# Patient Record
Sex: FEMALE | Race: WHITE
[De-identification: names, ages, dates, MRNs, and addresses within clinical notes are randomized per-mention and may not be internally consistent; named-entity substitution may affect disease eponyms.]

---

## 2018-12-29 ENCOUNTER — HOSPITAL ENCOUNTER (INPATIENT)
Dept: HOSPITAL 15 - ER | Age: 73
LOS: 2 days | Discharge: HOME | DRG: 872 | End: 2018-12-31
Attending: INTERNAL MEDICINE | Admitting: INTERNAL MEDICINE
Payer: COMMERCIAL

## 2018-12-29 VITALS — SYSTOLIC BLOOD PRESSURE: 158 MMHG | DIASTOLIC BLOOD PRESSURE: 119 MMHG

## 2018-12-29 VITALS — BODY MASS INDEX: 29.48 KG/M2 | HEIGHT: 66 IN | WEIGHT: 183.42 LBS

## 2018-12-29 DIAGNOSIS — E11.22: ICD-10-CM

## 2018-12-29 DIAGNOSIS — M41.9: ICD-10-CM

## 2018-12-29 DIAGNOSIS — I48.92: ICD-10-CM

## 2018-12-29 DIAGNOSIS — N18.3: ICD-10-CM

## 2018-12-29 DIAGNOSIS — A04.72: ICD-10-CM

## 2018-12-29 DIAGNOSIS — I48.91: ICD-10-CM

## 2018-12-29 DIAGNOSIS — E86.0: ICD-10-CM

## 2018-12-29 DIAGNOSIS — E78.5: ICD-10-CM

## 2018-12-29 DIAGNOSIS — J98.11: ICD-10-CM

## 2018-12-29 DIAGNOSIS — I13.10: ICD-10-CM

## 2018-12-29 DIAGNOSIS — A41.9: Primary | ICD-10-CM

## 2018-12-29 DIAGNOSIS — D68.69: ICD-10-CM

## 2018-12-29 DIAGNOSIS — E87.6: ICD-10-CM

## 2018-12-29 DIAGNOSIS — K29.70: ICD-10-CM

## 2018-12-29 DIAGNOSIS — E11.21: ICD-10-CM

## 2018-12-29 DIAGNOSIS — K29.80: ICD-10-CM

## 2018-12-29 DIAGNOSIS — K26.3: ICD-10-CM

## 2018-12-29 DIAGNOSIS — N39.0: ICD-10-CM

## 2018-12-29 DIAGNOSIS — E44.0: ICD-10-CM

## 2018-12-29 LAB
ALBUMIN SERPL-MCNC: 3.2 G/DL (ref 3.4–5)
ALP SERPL-CCNC: 161 U/L (ref 45–117)
ALT SERPL-CCNC: 23 U/L (ref 13–56)
AMYLASE SERPL-CCNC: 38 U/L (ref 25–115)
ANION GAP SERPL CALCULATED.3IONS-SCNC: 7 MMOL/L (ref 5–15)
APTT PPP: 29.8 SEC (ref 23.78–33.04)
BILIRUB SERPL-MCNC: 0.4 MG/DL (ref 0.2–1)
BUN SERPL-MCNC: 22 MG/DL (ref 7–18)
BUN/CREAT SERPL: 18.8
CALCIUM SERPL-MCNC: 8.8 MG/DL (ref 8.5–10.1)
CHLORIDE SERPL-SCNC: 103 MMOL/L (ref 98–107)
CO2 SERPL-SCNC: 27 MMOL/L (ref 21–32)
GLUCOSE SERPL-MCNC: 220 MG/DL (ref 74–106)
HCT VFR BLD AUTO: 42.3 % (ref 36–46)
HGB BLD-MCNC: 14.1 G/DL (ref 12.2–16.2)
INR PPP: 1 (ref 0.9–1.15)
LIPASE SERPL-CCNC: 242 U/L (ref 73–393)
MAGNESIUM SERPL-MCNC: 1.9 MG/DL (ref 1.6–2.6)
MCH RBC QN AUTO: 28.9 PG (ref 28–32)
MCV RBC AUTO: 86.5 FL (ref 80–100)
NRBC BLD QL AUTO: 0 %
POTASSIUM SERPL-SCNC: 3.1 MMOL/L (ref 3.5–5.1)
PROT SERPL-MCNC: 7.3 G/DL (ref 6.4–8.2)
PROTHROMBIN TIME: 10.7 SEC (ref 9.27–12.13)
SODIUM SERPL-SCNC: 137 MMOL/L (ref 136–145)

## 2018-12-29 PROCEDURE — 82150 ASSAY OF AMYLASE: CPT

## 2018-12-29 PROCEDURE — 84484 ASSAY OF TROPONIN QUANT: CPT

## 2018-12-29 PROCEDURE — 82962 GLUCOSE BLOOD TEST: CPT

## 2018-12-29 PROCEDURE — 93306 TTE W/DOPPLER COMPLETE: CPT

## 2018-12-29 PROCEDURE — 83690 ASSAY OF LIPASE: CPT

## 2018-12-29 PROCEDURE — 96375 TX/PRO/DX INJ NEW DRUG ADDON: CPT

## 2018-12-29 PROCEDURE — 43239 EGD BIOPSY SINGLE/MULTIPLE: CPT

## 2018-12-29 PROCEDURE — 85730 THROMBOPLASTIN TIME PARTIAL: CPT

## 2018-12-29 PROCEDURE — 74176 CT ABD & PELVIS W/O CONTRAST: CPT

## 2018-12-29 PROCEDURE — 83036 HEMOGLOBIN GLYCOSYLATED A1C: CPT

## 2018-12-29 PROCEDURE — 87086 URINE CULTURE/COLONY COUNT: CPT

## 2018-12-29 PROCEDURE — 94761 N-INVAS EAR/PLS OXIMETRY MLT: CPT

## 2018-12-29 PROCEDURE — 80053 COMPREHEN METABOLIC PANEL: CPT

## 2018-12-29 PROCEDURE — 87493 C DIFF AMPLIFIED PROBE: CPT

## 2018-12-29 PROCEDURE — 96361 HYDRATE IV INFUSION ADD-ON: CPT

## 2018-12-29 PROCEDURE — 87040 BLOOD CULTURE FOR BACTERIA: CPT

## 2018-12-29 PROCEDURE — 83735 ASSAY OF MAGNESIUM: CPT

## 2018-12-29 PROCEDURE — 36415 COLL VENOUS BLD VENIPUNCTURE: CPT

## 2018-12-29 PROCEDURE — 83605 ASSAY OF LACTIC ACID: CPT

## 2018-12-29 PROCEDURE — 86850 RBC ANTIBODY SCREEN: CPT

## 2018-12-29 PROCEDURE — 81001 URINALYSIS AUTO W/SCOPE: CPT

## 2018-12-29 PROCEDURE — 87899 AGENT NOS ASSAY W/OPTIC: CPT

## 2018-12-29 PROCEDURE — 80048 BASIC METABOLIC PNL TOTAL CA: CPT

## 2018-12-29 PROCEDURE — 96365 THER/PROPH/DIAG IV INF INIT: CPT

## 2018-12-29 PROCEDURE — 87045 FECES CULTURE AEROBIC BACT: CPT

## 2018-12-29 PROCEDURE — 86901 BLOOD TYPING SEROLOGIC RH(D): CPT

## 2018-12-29 PROCEDURE — 85610 PROTHROMBIN TIME: CPT

## 2018-12-29 PROCEDURE — 85025 COMPLETE CBC W/AUTO DIFF WBC: CPT

## 2018-12-29 PROCEDURE — 93005 ELECTROCARDIOGRAM TRACING: CPT

## 2018-12-29 PROCEDURE — 86900 BLOOD TYPING SEROLOGIC ABO: CPT

## 2018-12-29 PROCEDURE — 71045 X-RAY EXAM CHEST 1 VIEW: CPT

## 2018-12-29 RX ADMIN — SODIUM CHLORIDE SCH MLS/HR: 0.9 INJECTION, SOLUTION INTRAVENOUS at 23:09

## 2018-12-29 RX ADMIN — ONDANSETRON HYDROCHLORIDE PRN MG: 2 INJECTION, SOLUTION INTRAMUSCULAR; INTRAVENOUS at 19:45

## 2018-12-29 RX ADMIN — TEMAZEPAM PRN MG: 15 CAPSULE ORAL at 22:16

## 2018-12-29 RX ADMIN — SODIUM CHLORIDE SCH MLS/HR: 0.9 INJECTION, SOLUTION INTRAVENOUS at 15:24

## 2018-12-29 RX ADMIN — MORPHINE SULFATE PRN MG: 4 INJECTION, SOLUTION INTRAMUSCULAR; INTRAVENOUS at 19:45

## 2018-12-29 RX ADMIN — MORPHINE SULFATE PRN MG: 4 INJECTION, SOLUTION INTRAMUSCULAR; INTRAVENOUS at 15:24

## 2018-12-29 RX ADMIN — ATORVASTATIN CALCIUM SCH MG: 20 TABLET, FILM COATED ORAL at 22:15

## 2018-12-29 NOTE — NUR
Patient noted with open wound to left big toe. Wound consult placed in and picture taken per 
protocol.

## 2018-12-29 NOTE — NUR
Telemetry admit from ER

SARAHMOMO admitted to Telemetry unit after hand off tool received.  Patient oriented to 
Rachel Balderrama, primary RN, unit, room, bed, and unit policies regarding patient care 
and visiting hours. Patient now on continuous telemetry monitoring, tele box # 2 and 
telemetry reading on arrival to unit is Afib with St depresion at 76. Patient placed on 
bedside oxygen, weighed by bedscale and encouraged to call if they need something. All 
questions and concerns addressed, patient verbalized understanding.

## 2018-12-30 VITALS — DIASTOLIC BLOOD PRESSURE: 119 MMHG | SYSTOLIC BLOOD PRESSURE: 158 MMHG

## 2018-12-30 VITALS — SYSTOLIC BLOOD PRESSURE: 118 MMHG | DIASTOLIC BLOOD PRESSURE: 65 MMHG

## 2018-12-30 VITALS — DIASTOLIC BLOOD PRESSURE: 67 MMHG | SYSTOLIC BLOOD PRESSURE: 127 MMHG

## 2018-12-30 VITALS — SYSTOLIC BLOOD PRESSURE: 123 MMHG | DIASTOLIC BLOOD PRESSURE: 74 MMHG

## 2018-12-30 VITALS — SYSTOLIC BLOOD PRESSURE: 106 MMHG | DIASTOLIC BLOOD PRESSURE: 52 MMHG

## 2018-12-30 VITALS — DIASTOLIC BLOOD PRESSURE: 71 MMHG | SYSTOLIC BLOOD PRESSURE: 124 MMHG

## 2018-12-30 LAB
ALBUMIN SERPL-MCNC: 2.4 G/DL (ref 3.4–5)
ALP SERPL-CCNC: 119 U/L (ref 45–117)
ALT SERPL-CCNC: 17 U/L (ref 13–56)
ANION GAP SERPL CALCULATED.3IONS-SCNC: 6 MMOL/L (ref 5–15)
ANION GAP SERPL CALCULATED.3IONS-SCNC: 7 MMOL/L (ref 5–15)
BILIRUB SERPL-MCNC: 0.4 MG/DL (ref 0.2–1)
BUN SERPL-MCNC: 14 MG/DL (ref 7–18)
BUN SERPL-MCNC: 17 MG/DL (ref 7–18)
BUN/CREAT SERPL: 13
BUN/CREAT SERPL: 17.5
CALCIUM SERPL-MCNC: 7.6 MG/DL (ref 8.5–10.1)
CALCIUM SERPL-MCNC: 7.7 MG/DL (ref 8.5–10.1)
CHLORIDE SERPL-SCNC: 108 MMOL/L (ref 98–107)
CHLORIDE SERPL-SCNC: 109 MMOL/L (ref 98–107)
CO2 SERPL-SCNC: 23 MMOL/L (ref 21–32)
CO2 SERPL-SCNC: 25 MMOL/L (ref 21–32)
GLUCOSE SERPL-MCNC: 175 MG/DL (ref 74–106)
GLUCOSE SERPL-MCNC: 215 MG/DL (ref 74–106)
HCT VFR BLD AUTO: 33.8 % (ref 36–46)
HGB BLD-MCNC: 11.4 G/DL (ref 12.2–16.2)
MCH RBC QN AUTO: 29.4 PG (ref 28–32)
MCV RBC AUTO: 86.9 FL (ref 80–100)
NRBC BLD QL AUTO: 0.1 %
POTASSIUM SERPL-SCNC: 2.6 MMOL/L (ref 3.5–5.1)
POTASSIUM SERPL-SCNC: 3.2 MMOL/L (ref 3.5–5.1)
PROT SERPL-MCNC: 5.4 G/DL (ref 6.4–8.2)
SODIUM SERPL-SCNC: 138 MMOL/L (ref 136–145)
SODIUM SERPL-SCNC: 140 MMOL/L (ref 136–145)

## 2018-12-30 RX ADMIN — MULTIVITAMIN TABLET SCH TAB: TABLET at 10:50

## 2018-12-30 RX ADMIN — ATORVASTATIN CALCIUM SCH MG: 20 TABLET, FILM COATED ORAL at 21:02

## 2018-12-30 RX ADMIN — HUMAN INSULIN SCH UNITS: 100 INJECTION, SOLUTION SUBCUTANEOUS at 17:38

## 2018-12-30 RX ADMIN — SODIUM CHLORIDE SCH MLS/HR: 0.9 INJECTION, SOLUTION INTRAVENOUS at 15:49

## 2018-12-30 RX ADMIN — SUCRALFATE SCH GM: 1 SUSPENSION ORAL at 21:02

## 2018-12-30 RX ADMIN — BENAZEPRIL HYDROCHLORIDE SCH MG: 10 TABLET, FILM COATED ORAL at 10:50

## 2018-12-30 RX ADMIN — SUCRALFATE SCH GM: 1 SUSPENSION ORAL at 17:37

## 2018-12-30 RX ADMIN — CEFTRIAXONE SODIUM SCH MLS/HR: 1 INJECTION, POWDER, FOR SOLUTION INTRAMUSCULAR; INTRAVENOUS at 08:39

## 2018-12-30 RX ADMIN — Medication SCH STRIP: at 17:38

## 2018-12-30 RX ADMIN — MORPHINE SULFATE PRN MG: 4 INJECTION, SOLUTION INTRAMUSCULAR; INTRAVENOUS at 16:52

## 2018-12-30 RX ADMIN — TEMAZEPAM PRN MG: 15 CAPSULE ORAL at 21:02

## 2018-12-30 RX ADMIN — SODIUM CHLORIDE SCH MLS/HR: 0.9 INJECTION, SOLUTION INTRAVENOUS at 08:37

## 2018-12-30 RX ADMIN — TEMAZEPAM PRN MG: 15 CAPSULE ORAL at 22:35

## 2018-12-30 RX ADMIN — SUCRALFATE SCH GM: 1 SUSPENSION ORAL at 10:49

## 2018-12-30 RX ADMIN — HUMAN INSULIN SCH UNITS: 100 INJECTION, SOLUTION SUBCUTANEOUS at 11:30

## 2018-12-30 RX ADMIN — HUMAN INSULIN SCH UNITS: 100 INJECTION, SOLUTION SUBCUTANEOUS at 21:02

## 2018-12-30 RX ADMIN — Medication SCH STRIP: at 21:02

## 2018-12-30 RX ADMIN — Medication SCH STRIP: at 11:30

## 2018-12-30 NOTE — NUR
Opening shift note

Patient in bed sleeping with eyes closed, easily arousable to verbal stimuli. patient's 
respiration even and unlabored, denies pain and discomfort at this time. Plan of care 
discussed, patient verbalized understanding. All needs attended, will continue to monitor.

## 2018-12-30 NOTE — NUR
WOUND CARE NOTE:

Wound care in to see patient per wound care request regarding "Diabetic Ulcer to left big 
toe"  that are noted present on admission. Bedside nurse took photograph of patient's wound 
upon admission for reference. Patient is 72 y/o female with admitting diagnosis of 
Abdominal Pain, N/D/D. Patient is resting in bed in Rm 249B.  Patient is awake, alert and 
fully oriented.  Patient denies any pain at this time.  She's ambulatory and self  turn and 
reposition. Her current Merritt score is 21.



Noted 0.8x0.8cm open full thickness diabetic foot ulcer with no measurable depth to 
patient's L great toe plantar area.  Wound is pale pink with yellow slough, mar wound is 
yellow hyperkeratotic skin, scant serous drainage noted, no odor noted. Patient states that 
L great toe wound is actually smaller and better. Patient and family reported that patient 
has had the wound fro eight months. They continued further that patient is under the care of 
podiatrist in [place]. Patient states that wound was once closed and just reopen recently due 
to walking and rubbing. Patient and  family education given regarding wound care and 
Diabetic Foot care, verbalized understanding. Cleansed Lt. plantar great toe  wound with NS, 
patted dry with sterile gauze, applied Thera honey gel, covered with Opti foam and secured 
with CoBan.  Patient tolerated well. Patient denies any other wound.



RECOMMENDATION:

EOD/PRN dressing change to Lt. great toe wound per MD order, dietary consult, continue 
monitoring by wound care while patient is hospitalized.

-------------------------------------------------------------------------------

Addendum: 12/30/18 at 1539 by Tianna Wilburn RN

-------------------------------------------------------------------------------

Amended: Links added.

## 2018-12-31 VITALS — DIASTOLIC BLOOD PRESSURE: 92 MMHG | SYSTOLIC BLOOD PRESSURE: 165 MMHG

## 2018-12-31 VITALS — DIASTOLIC BLOOD PRESSURE: 42 MMHG | SYSTOLIC BLOOD PRESSURE: 144 MMHG

## 2018-12-31 VITALS — SYSTOLIC BLOOD PRESSURE: 135 MMHG | DIASTOLIC BLOOD PRESSURE: 68 MMHG

## 2018-12-31 LAB
ANION GAP SERPL CALCULATED.3IONS-SCNC: 5 MMOL/L (ref 5–15)
BUN SERPL-MCNC: 11 MG/DL (ref 7–18)
BUN/CREAT SERPL: 10.9
CALCIUM SERPL-MCNC: 8.1 MG/DL (ref 8.5–10.1)
CHLORIDE SERPL-SCNC: 111 MMOL/L (ref 98–107)
CO2 SERPL-SCNC: 26 MMOL/L (ref 21–32)
GLUCOSE SERPL-MCNC: 174 MG/DL (ref 74–106)
HCT VFR BLD AUTO: 35 % (ref 36–46)
HGB BLD-MCNC: 11.8 G/DL (ref 12.2–16.2)
MCH RBC QN AUTO: 29.3 PG (ref 28–32)
MCV RBC AUTO: 87 FL (ref 80–100)
NRBC BLD QL AUTO: 0.1 %
POTASSIUM SERPL-SCNC: 3.4 MMOL/L (ref 3.5–5.1)
SODIUM SERPL-SCNC: 142 MMOL/L (ref 136–145)

## 2018-12-31 PROCEDURE — 0DB68ZX EXCISION OF STOMACH, VIA NATURAL OR ARTIFICIAL OPENING ENDOSCOPIC, DIAGNOSTIC: ICD-10-PCS | Performed by: INTERNAL MEDICINE

## 2018-12-31 RX ADMIN — FENTANYL CITRATE ONE MCG: 50 INJECTION, SOLUTION INTRAMUSCULAR; INTRAVENOUS at 11:21

## 2018-12-31 RX ADMIN — BENAZEPRIL HYDROCHLORIDE SCH MG: 10 TABLET, FILM COATED ORAL at 13:17

## 2018-12-31 RX ADMIN — SUCRALFATE SCH GM: 1 SUSPENSION ORAL at 13:17

## 2018-12-31 RX ADMIN — ONDANSETRON HYDROCHLORIDE PRN MG: 2 INJECTION, SOLUTION INTRAMUSCULAR; INTRAVENOUS at 09:26

## 2018-12-31 RX ADMIN — HUMAN INSULIN SCH UNITS: 100 INJECTION, SOLUTION SUBCUTANEOUS at 06:30

## 2018-12-31 RX ADMIN — HUMAN INSULIN SCH UNITS: 100 INJECTION, SOLUTION SUBCUTANEOUS at 13:16

## 2018-12-31 RX ADMIN — SODIUM CHLORIDE SCH MLS/HR: 0.9 INJECTION, SOLUTION INTRAVENOUS at 11:22

## 2018-12-31 RX ADMIN — MULTIVITAMIN TABLET SCH TAB: TABLET at 13:16

## 2018-12-31 RX ADMIN — MORPHINE SULFATE PRN MG: 4 INJECTION, SOLUTION INTRAMUSCULAR; INTRAVENOUS at 06:11

## 2018-12-31 RX ADMIN — MIDAZOLAM HYDROCHLORIDE ONE MG: 5 INJECTION INTRAMUSCULAR; INTRAVENOUS at 11:21

## 2018-12-31 RX ADMIN — Medication SCH STRIP: at 06:30

## 2018-12-31 RX ADMIN — SUCRALFATE SCH GM: 1 SUSPENSION ORAL at 06:29

## 2018-12-31 RX ADMIN — SODIUM CHLORIDE SCH MLS/HR: 0.9 INJECTION, SOLUTION INTRAVENOUS at 00:52

## 2018-12-31 RX ADMIN — MIDAZOLAM HYDROCHLORIDE ONE MG: 5 INJECTION INTRAMUSCULAR; INTRAVENOUS at 11:18

## 2018-12-31 RX ADMIN — Medication SCH STRIP: at 13:15

## 2018-12-31 RX ADMIN — CEFTRIAXONE SODIUM SCH MLS/HR: 1 INJECTION, POWDER, FOR SOLUTION INTRAMUSCULAR; INTRAVENOUS at 11:22

## 2018-12-31 RX ADMIN — FENTANYL CITRATE ONE MCG: 50 INJECTION, SOLUTION INTRAMUSCULAR; INTRAVENOUS at 11:18

## 2018-12-31 NOTE — NUR
C.DIFF

Received call from microbiology stating patient positive for c.diff. Dr Arce made aware, 
MD states he will be over to see patient and review chart. Will notify charge RN and 
transfer patient to private room.

## 2018-12-31 NOTE — NUR
ECHO/RETURN TO UNIT

Patient s/p EGD. Patient has no complaints of pain or s/s distress. ECHO tech bedside to 
perform test.

## 2018-12-31 NOTE — NUR
Discharge instructions given as ordered. Encourage to follow up with PMD as instructed. All 
questions and concerns addressed. Patient verbalized understanding. Medication 
reconciliation form completed and copy given to patient. IV removed with catheter intact, 
pressure dressing applied. Wound photos taken per protocol. Telemetry unit returned to ICU.

## 2024-07-01 NOTE — NUR
Opening Shift Note

Assumed care of patient, awake and alert. No S/S of distress/SOB or pain.  Instructed on POC 
and to call for assist PRN, will continue to monitor for changes Q1hr and PRN. Discontinue Regimen: Absorica 30 mg capsule Daily Sig: Take One pill bid with a meal Detail Level: Zone Plan: Finish current medication then DC, can start tretinion one to two months after finishing oral medication. Mother reports patient has noticed stomach upset recently. Recommended follow up with PCP Initiate Treatment: tretinoin 0.025 % topical cream Daily\\nApply a pea size amount to face every third night. Increase to nightly as tolerated. Apply after moisturizer.\\n(Start 1-2 months after last dose of accutane)